# Patient Record
Sex: MALE | Race: BLACK OR AFRICAN AMERICAN | Employment: STUDENT | URBAN - METROPOLITAN AREA
[De-identification: names, ages, dates, MRNs, and addresses within clinical notes are randomized per-mention and may not be internally consistent; named-entity substitution may affect disease eponyms.]

---
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
## 2020-01-15 ENCOUNTER — HOSPITAL ENCOUNTER (OUTPATIENT)
Dept: PHYSICAL THERAPY | Age: 20
End: 2020-01-15

## 2020-01-17 ENCOUNTER — APPOINTMENT (OUTPATIENT)
Dept: PHYSICAL THERAPY | Age: 20
End: 2020-01-17

## 2020-01-31 NOTE — PROGRESS NOTES
[address] [facility]  [address] [facility] [address], [address] - Phone: [phone]  Fax: [phone] SUMMARY FOR PHYSICAL THERAPY          Patient Name: Darryle Duhamel : 2000   Treatment/Medical Diagnosis: Neck pain [M54.2]  Low back pain [M54.5]   Onset Date: 2018    Referral Source: Miguel Wagner DO Start of Care [facility]): 12/10/2019   Prior Hospitalization: See medical history Provider #: [id number]   Prior Level of Function: Independent, recreationally active   Comorbidities: Unremarkable   Medications: Verified on Patient Summary List   Visits from [facility]: 1 Missed Visits: 0     SUMMARY OF TREATMENT  Patient's POC has consisted of therex, therapeutic activities, manual therapy prn, modalities prn, pt. education, and a comprehensive HEP. Treatment strategies used to address functional mobility deficits, ROM deficits, strength deficits, analyze and address soft tissue restrictions, analyze and cue movement patterns, analyze and modify body mechanics/ergonomics, assess and modify postural abnormalities and instruct in home and community integration. Key Functional Changes/Progress: Mr. Sissy Dyer was evaluated on 12/10/2019 for L/S pain associated with abdominal numbness. Pt initially did not schedule f/u appointments d/t going on winter break for school. After return to school, PT reached out to patient to schedule future appointments however no response. PT to be D/C d/t lack of attendance. If patient is able to attend sessions regularly, our PT clinic would love to provide the latest evidence based care to facilitate a return to his PLOF. Assessments/Recommendations: Discontinue therapy due to lack of attendance or compliance. If you have any questions/comments please contact us directly at [phone]. Thank you for allowing us to assist in the care of your patient.     Therapist Signature:  Will Montejo DPT Date: 2020 Reporting Period: 12/10/2019 Time: 7:10 PM      Certification Period: NA       NOTE TO PHYSICIAN:  PLEASE COMPLETE THE ORDERS BELOW AND FAX TO   [facility]: [phone]. If you are unable to process this request in 24 hours please contact our office: [phone].    ___ I have read the above report and request that my patient be discharged from therapy.      Physician Signature:        Date:       Time:
